# Patient Record
Sex: FEMALE | Race: WHITE
[De-identification: names, ages, dates, MRNs, and addresses within clinical notes are randomized per-mention and may not be internally consistent; named-entity substitution may affect disease eponyms.]

---

## 2017-09-03 NOTE — EDM.PDOC
ED HPI GENERAL MEDICAL PROBLEM





- General


Chief Complaint: Chest Pain


Stated Complaint: SOB


Time Seen by Provider: 09/03/17 14:00


Source of Information: Reports: Patient


History Limitations: Reports: No Limitations





- History of Present Illness


INITIAL COMMENTS - FREE TEXT/NARRATIVE: 





Brissa comes in approximately 10 minutes after experiencing an episode of 

dizziness, rapid heart beat and weakness, associated with some retrosternal 

chest pains. Sxs lasted for less than a minute, but she is visibly shaken. 

There was no headache, change in vision, SOB, nausea, or sweats. Of interest is 

a PMH of "racing heart" studied at the [facility] in 2011, no confirmation of 

tachyarrthymia, and managed with Cartia XT successfully over the past 4 years. 

She has been med compliant. 


  ** Chest


Pain Score (Numeric/FACES): 0





- Related Data


 Allergies











Allergy/AdvReac Type Severity Reaction Status Date / Time


 


Antihistamines - Alkylamine Allergy  Cannot Verified 09/03/17 14:05





   Remember  


 


iodine Allergy  Cannot Verified 09/03/17 14:05





   Remember  


 


Penicillins Allergy  Cannot Verified 09/03/17 14:05





   Remember  


 


povidone-iodine Allergy  Cannot Verified 09/03/17 14:05





[From Betadine]   Remember  


 


soap [From Betadine] Allergy  Cannot Verified 09/03/17 14:05





   Remember  


 


Sulfa (Sulfonamide Allergy  Cannot Verified 09/03/17 14:05





Antibiotics)   Remember  











Home Meds: 


 Home Meds





Aspirin [Adult Low Dose Aspirin EC] 81 mg PO DAILY 06/03/14 [History]


Diltiazem [Cardizem CD] 180 mg PO DAILY 06/03/14 [History]


Hydrochlorothiazide/Lisinopril [Lisinopril/HCTZ 20-12.5 MG] 1 tab PO DAILY 06/03 /14 [History]


atorvaSTATin Calcium [Atorvastatin Calcium] 10 mg PO DAILY 06/03/14 [History]











Past Medical History


Cardiovascular History: Reports: Other (See Below) (cardiac dysrhythmia)





Social & Family History





- Tobacco Use


Second Hand Smoke Exposure: Yes





- Alcohol Use


Days Per Week of Alcohol Use: 0





- Recreational Drug Use


Recreational Drug Use: No





ED ROS GENERAL





- Review of Systems


Review Of Systems: ROS reveals no pertinent complaints other than HPI.





ED EXAM, GENERAL





- Physical Exam


Exam: See Below


Exam Limited By: No Limitations


General Appearance: Alert, WD/WN, Anxious


Throat/Mouth: Normal Inspection, Normal Oropharynx, No Airway Compromise


Head: Normocephalic


Neck: Normal Inspection, Supple, Non-Tender


Respiratory/Chest: No Respiratory Distress, Lungs Clear, Normal Breath Sounds, 

Chest Non-Tender


Cardiovascular: Normal Peripheral Pulses, Regular Rate, Rhythm, No Edema, No 

Gallop, No Murmur


GI/Abdominal: Normal Bowel Sounds, Soft, Non-Tender, No Organomegaly, No 

Distention, No Mass


Back Exam: Normal Inspection


Extremities: Normal Inspection


Neurological: Alert, Oriented, CN II-XII Intact, Normal Cognition, No Motor/

Sensory Deficits


Psychiatric: Normal Affect, Anxious


Skin Exam: Warm, Dry


Lymphatic: No Adenopathy





Course





- Vital Signs


Text/Narrative:: 





Brissa remained stable at the [facility] ED. No meds were administered. She remained 

in a NSR.


Last Recorded V/S: 


 Last Vital Signs











Temp  36.6 C   09/03/17 14:10


 


Pulse  94   09/03/17 14:10


 


Resp  21 H  09/03/17 14:10


 


BP  136/57 L  09/03/17 14:10


 


Pulse Ox  95   09/03/17 14:10














- Orders/Labs/Meds


Orders: 


 Active Orders 24 hr











 Category Date Time Status


 


 EKG Documentation Completion [RC] ASDIRECTED Care  09/03/17 14:06 Active


 


 Sodium Chloride 0.9% [Saline Flush] Med  09/03/17 14:06 Active





 10 ml FLUSH ASDIRECTED PRN   


 


 Saline Lock Insert [OM.PC] Routine Oth  09/03/17 14:06 Ordered


 


 EKG 12 Lead [EK] Routine Ther  09/03/17 14:05 Ordered








 Medication Orders





Sodium Chloride (Saline Flush)  10 ml FLUSH ASDIRECTED PRN


   PRN Reason: Keep Vein Open


   Last Admin: 09/03/17 14:12  Dose: 10 ml








Labs: 


 Laboratory Tests











  09/03/17 09/03/17 09/03/17 Range/Units





  14:15 14:15 14:15 


 


WBC  8.1    (4.5-12.0)  X10-3/uL


 


RBC  4.70    (3.23-5.20)  x10(6)uL


 


Hgb  14.1    (11.5-15.5)  g/dL


 


Hct  41.8    (30.0-51.3)  %


 


MCV  88.9    (80-96)  fL


 


MCH  30.1    (27.7-33.6)  pg


 


MCHC  33.8    (32.2-35.4)  g/dL


 


RDW  12.8    (11.5-15.5)  %


 


Plt Count  251    (125-369)  X10(3)uL


 


MPV  8.4    (7.4-10.4)  fL


 


Neut % (Auto)  64.7    (46-82)  %


 


Lymph % (Auto)  25.6    (13-37)  %


 


Mono % (Auto)  7.0    (4-12)  %


 


Eos % (Auto)  2    (1.0-5.0)  %


 


Baso % (Auto)  1    (0-2)  %


 


Neut # (Auto)  5.2    (1.6-8.3)  #


 


Lymph # (Auto)  2.1    (0.6-5.0)  #


 


Mono # (Auto)  0.6    (0.0-1.3)  #


 


Eos # (Auto)  0.2    (0.0-0.8)  #


 


Baso # (Auto)  0.0    (0.0-0.2)  #


 


Sodium   137   (135-145)  mmol/L


 


Potassium   3.9   (3.5-5.3)  mmol/L


 


Chloride   105   (100-110)  mmol/L


 


Carbon Dioxide   23   (23-29)  mmol/L


 


BUN   21   (8-23)  mg/dL


 


Creatinine   1.2   (0.6-1.3)  mg/dL


 


Est Cr Clr Drug Dosing   41.50   mL/min


 


Estimated GFR (MDRD)   45 L   (>60)  


 


BUN/Creatinine Ratio   17.5   (9-20)  


 


Glucose   152 H   ()  mg/dL


 


Calcium   9.4   (8.6-10.2)  mg/dL


 


Total Bilirubin   0.7   (0.1-1.3)  mg/dL


 


AST   22   (5-27)  IU/L


 


ALT   14   (14-26)  IU/L


 


Alkaline Phosphatase   92   ()  IU/L


 


Troponin I    < 0.01 L  (0.02-0.06)  NG/ML


 


Total Protein   7.4   (6.0-8.0)  g/dL


 


Albumin   4.4   (3.2-4.6)  g/dL


 


Globulin   3.0   g/dL


 


Albumin/Globulin Ratio   1.5   


 


TSH, Ultra Sensitive     (0.4-5.5)  nlU/mL














  09/03/17 Range/Units





  14:15 


 


WBC   (4.5-12.0)  X10-3/uL


 


RBC   (3.23-5.20)  x10(6)uL


 


Hgb   (11.5-15.5)  g/dL


 


Hct   (30.0-51.3)  %


 


MCV   (80-96)  fL


 


MCH   (27.7-33.6)  pg


 


MCHC   (32.2-35.4)  g/dL


 


RDW   (11.5-15.5)  %


 


Plt Count   (125-369)  X10(3)uL


 


MPV   (7.4-10.4)  fL


 


Neut % (Auto)   (46-82)  %


 


Lymph % (Auto)   (13-37)  %


 


Mono % (Auto)   (4-12)  %


 


Eos % (Auto)   (1.0-5.0)  %


 


Baso % (Auto)   (0-2)  %


 


Neut # (Auto)   (1.6-8.3)  #


 


Lymph # (Auto)   (0.6-5.0)  #


 


Mono # (Auto)   (0.0-1.3)  #


 


Eos # (Auto)   (0.0-0.8)  #


 


Baso # (Auto)   (0.0-0.2)  #


 


Sodium   (135-145)  mmol/L


 


Potassium   (3.5-5.3)  mmol/L


 


Chloride   (100-110)  mmol/L


 


Carbon Dioxide   (23-29)  mmol/L


 


BUN   (8-23)  mg/dL


 


Creatinine   (0.6-1.3)  mg/dL


 


Est Cr Clr Drug Dosing   mL/min


 


Estimated GFR (MDRD)   (>60)  


 


BUN/Creatinine Ratio   (9-20)  


 


Glucose   ()  mg/dL


 


Calcium   (8.6-10.2)  mg/dL


 


Total Bilirubin   (0.1-1.3)  mg/dL


 


AST   (5-27)  IU/L


 


ALT   (14-26)  IU/L


 


Alkaline Phosphatase   ()  IU/L


 


Troponin I   (0.02-0.06)  NG/ML


 


Total Protein   (6.0-8.0)  g/dL


 


Albumin   (3.2-4.6)  g/dL


 


Globulin   g/dL


 


Albumin/Globulin Ratio   


 


TSH, Ultra Sensitive  1.67  (0.4-5.5)  nlU/mL











Meds: 


Medications











Generic Name Dose Route Start Last Admin





  Trade Name Lopez  PRN Reason Stop Dose Admin


 


Sodium Chloride  10 ml  09/03/17 14:06  09/03/17 14:12





  Saline Flush  FLUSH   10 ml





  ASDIRECTED PRN   Administration





  Keep Vein Open   














Discontinued Medications














Generic Name Dose Route Start Last Admin





  Trade Name Lopez  PRN Reason Stop Dose Admin


 


Aspirin  324 mg  09/03/17 14:06  09/03/17 14:00





  Aspirin  PO  09/03/17 14:07  324 mg





  ONETIME ONE   Administration














Departure





- Departure


Time of Disposition: 15:05


Disposition: Home, Self-Care 01


Condition: Good


Clinical Impression: 


 Atypical chest pain





Forms:  ED Department Discharge





- Problem List & Annotations


(1) Atypical chest pain


SNOMED Code(s): 017918887


   Code(s): R07.89 - OTHER CHEST PAIN   Status: Acute   Annotation/Comment:: 

Atypical chest pain, resolved. There were transient co-morbid sxs of dizziness 

that were momentary. She is asx. She will remain on the Cartia XT as directed, 

and follow up with PCP.    





- Problem List Review


Problem List Initiated/Reviewed/Updated: Yes





- My Orders


Last 24 Hours: 


My Active Orders





09/03/17 14:05


EKG 12 Lead [EK] Routine 





09/03/17 14:06


EKG Documentation Completion [RC] ASDIRECTED 


Sodium Chloride 0.9% [Saline Flush]   10 ml FLUSH ASDIRECTED PRN 


Saline Lock Insert [OM.PC] Routine 














- Assessment/Plan


Last 24 Hours: 


My Active Orders





09/03/17 14:05


EKG 12 Lead [EK] Routine 





09/03/17 14:06


EKG Documentation Completion [RC] ASDIRECTED 


Sodium Chloride 0.9% [Saline Flush]   10 ml FLUSH ASDIRECTED PRN 


Saline Lock Insert [OM.PC] Routine 











Plan: 





Follow up with PCP.

## 2019-04-30 NOTE — EDM.PDOC
ED HPI GENERAL MEDICAL PROBLEM





- General


Chief Complaint: Cardiovascular Problem


Stated Complaint: CHEST DISCOMFORT


Time Seen by Provider: 04/30/19 01:10


Source of Information: Reports: Patient, Family


History Limitations: Reports: No Limitations





- History of Present Illness


INITIAL COMMENTS - FREE TEXT/NARRATIVE: 





68 y.o.w.f with a h/o HTN, came to the ED with her  due to right 

shoulder pain which started a few min after she got dizzy and lightheaded while 

driving her car. Pt took 91 mg of ASA PTA. On arrival to the ED, he c/o right 

arm pain, going up her shoulder and neck. The pain is present  at rest, worse 

with movement of her shoulder. Pt denied trauma. No F/C, No N/V no sob. /

63 RR 18 Pulse ox 96% on RA Pulse 71 Temp 36.6


Onset Date: 04/29/19


Onset Time: 17:00


Duration: Hour(s):, Intermittent


Location: Reports: Upper Extremity, Right


Quality: Reports: Ache, Burning, Dull


Severity: Moderate


Improves with: Reports: Rest


Worsens with: Reports: Movement (of right shoulder)


Context: Reports: Other (pain came on at rest.)


Associated Symptoms: Reports: Diaphoresis


Treatments PTA: Reports: Aspirin (81 mg)


  ** R shoulder & neck


Pain Score (Numeric/FACES): 7





- Related Data


 Allergies











Allergy/AdvReac Type Severity Reaction Status Date / Time


 


Antihistamines - Alkylamine Allergy  Cannot Verified 04/30/19 01:20





   Remember  


 


iodine Allergy  Cannot Verified 04/30/19 01:20





   Remember  


 


Penicillins Allergy  Cannot Verified 04/30/19 01:20





   Remember  


 


povidone-iodine Allergy  Cannot Verified 04/30/19 01:20





[From Betadine]   Remember  


 


soap [From Betadine] Allergy  Cannot Verified 04/30/19 01:20





   Remember  


 


Sulfa (Sulfonamide Allergy  Cannot Verified 04/30/19 01:20





Antibiotics)   Remember  











Home Meds: 


 Home Meds





Aspirin [Adult Low Dose Aspirin EC] 81 mg PO DAILY 06/03/14 [History]


Diltiazem [Cardizem CD] 180 mg PO DAILY 06/03/14 [History]


Hydrochlorothiazide/Lisinopril [Lisinopril/HCTZ 20-12.5 MG] 1 tab PO DAILY 06/03 /14 [History]


atorvaSTATin Calcium [Atorvastatin Calcium] 10 mg PO BEDTIME 06/03/14 [History]











Past Medical History


Cardiovascular History: Reports: Other (See Below) (cardiac dysrhythmia)


OB/GYN History: Reports: Pregnancy


Musculoskeletal History: Reports: Back Pain, Chronic





- Past Surgical History


GI Surgical History: Reports: Appendectomy, Cholecystectomy, Colonoscopy





Social & Family History





- Caffeine Use


Caffeine Use: Reports: Coffee





ED ROS GENERAL





- Review of Systems


Review Of Systems: See Below


Constitutional: Reports: No Symptoms


HEENT: Reports: No Symptoms


Respiratory: Reports: No Symptoms


Cardiovascular: Reports: Chest Pain, Lightheadedness


Endocrine: Reports: No Symptoms


GI/Abdominal: Reports: No Symptoms


: Reports: No Symptoms


Musculoskeletal: Reports: No Symptoms


Skin: Reports: No Symptoms


Neurological: Reports: No Symptoms


Psychiatric: Reports: No Symptoms


Hematologic/Lymphatic: Reports: No Symptoms


Immunologic: Reports: No Symptoms





ED EXAM, GENERAL





- Physical Exam


Exam: See Below


Exam Limited By: No Limitations


General Appearance: Alert, WD/WN, Moderate Distress


Eye Exam: Bilateral Eye: Normal Inspection


Ears: Normal External Exam


Ear Exam: Bilateral Ear: Auricle Normal


Nose: Normal Inspection


Throat/Mouth: Normal Inspection


Head: Atraumatic, Normocephalic


Neck: Normal Inspection, Supple, Non-Tender


Respiratory/Chest: No Respiratory Distress, Lungs Clear, Normal Breath Sounds, 

Chest Non-Tender


Cardiovascular: Normal Peripheral Pulses, Regular Rate, Rhythm, No Edema


Peripheral Pulses: 1+: Brachial (R)


GI/Abdominal: Normal Bowel Sounds, Soft, Non-Tender, No Organomegaly


 (Female) Exam: Deferred


Rectal (Female) Exam: Deferred


Back Exam: Normal Inspection, Full Range of Motion


Extremities: Normal Inspection, Normal Range of Motion, Non-Tender


Neurological: Alert, Oriented, CN II-XII Intact, Normal Cognition, Normal Gait


Psychiatric: Normal Affect, Normal Mood


Skin Exam: Warm, Dry, Intact, Normal Color, No Rash


Lymphatic: No Adenopathy





EKG INTERPRETATION


EKG Date: 04/30/19


Time: 01:15


Rhythm: NSR


Rate (Beats/Min): 68


Axis: Normal


P-Wave: Present


QRS: Normal


ST-T: Normal


QT: Normal


Comparison: NA - No Prior EKG





Course





- Vital Signs


Text/Narrative:: 








68 y.o.w.f with a h/o HTN, came to the ED with her  due to right 

shoulder pain which started a few min after she got dizzy and lightheaded while 

driving her car. Pt took 91 mg of ASA PTA. On arrival to the ED, he c/o right 

arm pain, going up her shoulder and neck. The pain is present  at rest, worse 

with movement of her shoulder. Pt denied trauma. No F/C, No N/V no sob. /

63 RR 18 Pulse ox 96% on RA Pulse 71 Temp 36.6


PE: Obese 68 y.o.w.f with right shoulder pan at rest


Imaging: CXR: NAD Angio CT: Unable to be performed due to allergica to iodine


Labs: D Dimer: 0.81 CBC neg except WBC 12.4 BMP Nl except BUN was 25 Cr 1.3 GFR 

41 Ca 10.4 Troponin was nl 0.017


Impression: Elevated D Dimer, unstable angina


Tx: ASA, Lovenox


3.14 am Consultation: Russell, Hospitalist, [facility]: Accepted the patient 

for transfer and further care.


Reexam: Her pain subsided after Tx


Plan: Transfer to [facility] for further care


Last Recorded V/S: 


 Last Vital Signs











Temp  36.4 C   04/30/19 01:10


 


Pulse  78   04/30/19 01:10


 


Resp  18   04/30/19 01:10


 


BP  132/63   04/30/19 01:10


 


Pulse Ox  96   04/30/19 01:10














- Orders/Labs/Meds


Orders: 


 Active Orders 24 hr











 Category Date Time Status


 


 Ang Chest [CT] Stat Exams  04/30/19 02:18 Ordered


 


 Chest 1V Frontal [CR] Stat Exams  04/30/19 01:39 Taken


 


 Peripheral IV Insertion Adult [OM.PC] Routine Oth  04/30/19 03:37 Ordered











Labs: 


 Laboratory Tests











  04/30/19 04/30/19 04/30/19 Range/Units





  01:15 01:15 01:15 


 


WBC  12.1 H    (4.5-12.0)  X10-3/uL


 


RBC  5.03    (3.23-5.20)  x10(6)uL


 


Hgb  15.3    (11.5-15.5)  g/dL


 


Hct  45.6    (30.0-51.3)  %


 


MCV  90.6    (80-96)  fL


 


MCH  30.3    (27.7-33.6)  pg


 


MCHC  33.5    (32.2-35.4)  g/dL


 


RDW  12.8    (11.5-15.5)  %


 


Plt Count  229    (125-369)  X10(3)uL


 


MPV  9.5    (7.4-10.4)  fL


 


Neut % (Auto)  69.2    (46-82)  %


 


Lymph % (Auto)  20.4    (13-37)  %


 


Mono % (Auto)  6.5    (4-12)  %


 


Eos % (Auto)  2    (1.0-5.0)  %


 


Baso % (Auto)  2    (0-2)  %


 


Neut # (Auto)  8.4 H    (1.6-8.3)  #


 


Lymph # (Auto)  2.5    (0.6-5.0)  #


 


Mono # (Auto)  0.8    (0.0-1.3)  #


 


Eos # (Auto)  0.2    (0.0-0.8)  #


 


Baso # (Auto)  0.2    (0.0-0.2)  #


 


PT   9.6   (8.7-11.1)  


 


INR   0.99   (0.89-1.13)  


 


D-Dimer, Quantitative   0.81 H   (0.0-0.59)  mg/LFEU


 


Sodium    140  (135-145)  mmol/L


 


Potassium    3.7  (3.5-5.3)  mmol/L


 


Chloride    103  (100-110)  mmol/L


 


Carbon Dioxide    29  (21-32)  mmol/L


 


BUN    25 H  (7-18)  mg/dL


 


Creatinine    1.3 H  (0.55-1.02)  mg/dL


 


Est Cr Clr Drug Dosing    37.27  mL/min


 


Estimated GFR (MDRD)    41 L  (>60)  


 


BUN/Creatinine Ratio    19.2  (9-20)  


 


Glucose    122 H  ()  mg/dL


 


Calcium    10.4 H  (8.6-10.2)  mg/dL


 


Magnesium     (1.8-2.5)  mg/dL


 


Troponin I     (<0.017-0.056)  ng/mL














  04/30/19 04/30/19 Range/Units





  01:15 01:15 


 


WBC    (4.5-12.0)  X10-3/uL


 


RBC    (3.23-5.20)  x10(6)uL


 


Hgb    (11.5-15.5)  g/dL


 


Hct    (30.0-51.3)  %


 


MCV    (80-96)  fL


 


MCH    (27.7-33.6)  pg


 


MCHC    (32.2-35.4)  g/dL


 


RDW    (11.5-15.5)  %


 


Plt Count    (125-369)  X10(3)uL


 


MPV    (7.4-10.4)  fL


 


Neut % (Auto)    (46-82)  %


 


Lymph % (Auto)    (13-37)  %


 


Mono % (Auto)    (4-12)  %


 


Eos % (Auto)    (1.0-5.0)  %


 


Baso % (Auto)    (0-2)  %


 


Neut # (Auto)    (1.6-8.3)  #


 


Lymph # (Auto)    (0.6-5.0)  #


 


Mono # (Auto)    (0.0-1.3)  #


 


Eos # (Auto)    (0.0-0.8)  #


 


Baso # (Auto)    (0.0-0.2)  #


 


PT    (8.7-11.1)  


 


INR    (0.89-1.13)  


 


D-Dimer, Quantitative    (0.0-0.59)  mg/LFEU


 


Sodium    (135-145)  mmol/L


 


Potassium    (3.5-5.3)  mmol/L


 


Chloride    (100-110)  mmol/L


 


Carbon Dioxide    (21-32)  mmol/L


 


BUN    (7-18)  mg/dL


 


Creatinine    (0.55-1.02)  mg/dL


 


Est Cr Clr Drug Dosing    mL/min


 


Estimated GFR (MDRD)    (>60)  


 


BUN/Creatinine Ratio    (9-20)  


 


Glucose    ()  mg/dL


 


Calcium    (8.6-10.2)  mg/dL


 


Magnesium   2.0  (1.8-2.5)  mg/dL


 


Troponin I  < 0.017 L   (<0.017-0.056)  ng/mL











Meds: 


Medications














Discontinued Medications














Generic Name Dose Route Start Last Admin





  Trade Name Yaoq  PRN Reason Stop Dose Admin


 


Aspirin  243 mg  04/30/19 01:38  04/30/19 01:43





  Aspirin  PO  04/30/19 01:39  243 mg





  ONETIME ONE   Administration





     





     





     





     


 


Aspirin  162 mg  04/30/19 01:40  04/30/19 01:44





  Aspirin  PO  04/30/19 01:41  Not Given





  ONETIME STA   





     





     





     





     


 


Enoxaparin Sodium  100 mg  04/30/19 03:25  04/30/19 03:37





  Lovenox  SUBCUT  04/30/19 03:26  100 mg





  ONETIME STA   Administration





     





     





     





     


 


Sodium Chloride  500 mls @ 999 mls/hr  04/30/19 01:39  04/30/19 03:37





  Normal Saline  IV  04/30/19 02:09  Not Given





  .BOLUS ONE   





     





     





     





     


 


Iopamidol  75 ml  04/30/19 02:27  





  Isovue-370 (76%)  IV  04/30/19 02:28  





  ASDIRECTED ONE   





     





     





     





     


 


Sodium Chloride  10 ml  04/30/19 03:37  04/30/19 02:25





  Saline Flush  FLUSH   10 ml





  ASDIRECTED PRN   Administration





  Keep Vein Open   





     





     





     














Departure





- Departure


Time of Disposition: 03:49


Disposition: DC/Tfer to Acute Hospital [facility]


Reason for Transfer *Q: Other (No VQ scan availability)


Condition: Fair


Clinical Impression: 


 Unstable angina, D-dimer, elevated





Instructions:  Enoxaparin injection


Referrals: 


Swapnil Caldera MD [Primary Care Provider] - 


Forms:  ED Department Discharge





- My Orders


Last 24 Hours: 


My Active Orders





04/30/19 01:39


Chest 1V Frontal [CR] Stat 





04/30/19 02:18


Ang Chest [CT] Stat 





04/30/19 03:37


Peripheral IV Insertion Adult [OM.PC] Routine 














- Assessment/Plan


Last 24 Hours: 


My Active Orders





04/30/19 01:39


Chest 1V Frontal [CR] Stat 





04/30/19 02:18


Ang Chest [CT] Stat 





04/30/19 03:37


Peripheral IV Insertion Adult [OM.PC] Routine

## 2019-04-30 NOTE — CR
INDICATION:  Chest pain. 



CHEST ONE VIEW:  AP portable upright view of the chest was obtained 04/30/19 
and compared with 02/19/11. 



The heart did not appear enlarged. 



The aorta is tortuous to a moderate degree. 



Overlying EKG leads are noted. 



A definite active infiltrate or effusion was not identified. 



Evidence of exogenous obesity is noted. 



IMPRESSION:  No acute process. 
MTDD

## 2020-11-18 ENCOUNTER — HOSPITAL ENCOUNTER (EMERGENCY)
Dept: HOSPITAL 7 - FB.ED | Age: 70
Discharge: HOME | End: 2020-11-18
Payer: MEDICARE

## 2020-11-18 VITALS — DIASTOLIC BLOOD PRESSURE: 72 MMHG | SYSTOLIC BLOOD PRESSURE: 136 MMHG | HEART RATE: 77 BPM

## 2020-11-18 DIAGNOSIS — Z79.899: ICD-10-CM

## 2020-11-18 DIAGNOSIS — Z91.048: ICD-10-CM

## 2020-11-18 DIAGNOSIS — K59.00: ICD-10-CM

## 2020-11-18 DIAGNOSIS — R00.2: Primary | ICD-10-CM

## 2020-11-18 DIAGNOSIS — Z88.0: ICD-10-CM

## 2020-11-18 DIAGNOSIS — Z79.82: ICD-10-CM

## 2020-11-18 DIAGNOSIS — Z88.8: ICD-10-CM

## 2020-11-18 DIAGNOSIS — Z88.2: ICD-10-CM

## 2020-11-18 DIAGNOSIS — E66.9: ICD-10-CM

## 2020-11-18 PROCEDURE — 85025 COMPLETE CBC W/AUTO DIFF WBC: CPT

## 2020-11-18 PROCEDURE — 71045 X-RAY EXAM CHEST 1 VIEW: CPT

## 2020-11-18 PROCEDURE — 96376 TX/PRO/DX INJ SAME DRUG ADON: CPT

## 2020-11-18 PROCEDURE — 99285 EMERGENCY DEPT VISIT HI MDM: CPT

## 2020-11-18 PROCEDURE — 84484 ASSAY OF TROPONIN QUANT: CPT

## 2020-11-18 PROCEDURE — 93005 ELECTROCARDIOGRAM TRACING: CPT

## 2020-11-18 PROCEDURE — 36415 COLL VENOUS BLD VENIPUNCTURE: CPT

## 2020-11-18 PROCEDURE — 81001 URINALYSIS AUTO W/SCOPE: CPT

## 2020-11-18 PROCEDURE — 96374 THER/PROPH/DIAG INJ IV PUSH: CPT

## 2020-11-18 PROCEDURE — 80053 COMPREHEN METABOLIC PANEL: CPT

## 2020-11-18 PROCEDURE — 83735 ASSAY OF MAGNESIUM: CPT

## 2020-11-18 NOTE — EDM.PDOC
ED HPI GENERAL MEDICAL PROBLEM





- General


Chief Complaint: General


Stated Complaint: WEAKNESS,DIZZINESS


Time Seen by Provider: 20 15:40


Source of Information: Reports: Patient


History Limitations: Reports: No Limitations





- History of Present Illness


INITIAL COMMENTS - FREE TEXT/NARRATIVE: 





patient presented to the ED from the clinic because of palpitations, weakness, 

and dizziness. When she was triaged in the clinic she was tachycardic at 189 

although she denies any chest pain, nausea, vomiting, or dyspnea. She also c/o 

being constipated for 1 week now.


  ** low abdomen


Pain Score (Numeric/FACES): 3





- Related Data


                                    Allergies











Allergy/AdvReac Type Severity Reaction Status Date / Time


 


Antihistamines - Alkylamine Allergy  Cannot Verified 20 15:44





   Remember  


 


iodine Allergy  Cannot Verified 20 15:44





   Remember  


 


Penicillins Allergy  Cannot Verified 20 15:44





   Remember  


 


povidone-iodine Allergy  Cannot Verified 20 15:44





[From Betadine]   Remember  


 


soap [From Betadine] Allergy  Cannot Verified 20 15:44





   Remember  


 


Sulfa (Sulfonamide Allergy  Cannot Verified 20 15:44





Antibiotics)   Remember  











Home Meds: 


                                    Home Meds





Aspirin [Adult Low Dose Aspirin EC] 81 mg PO DAILY 14 [History]


Diltiazem [Cardizem CD] 180 mg PO DAILY 14 [History]


Hydrochlorothiazide/Lisinopril [Lisinopril/HCTZ 20-12.5 MG] 1 tab PO DAILY 

14 [History]


atorvaSTATin Calcium [Atorvastatin Calcium] 10 mg PO BEDTIME 14 [History]











Past Medical History


Cardiovascular History: Reports: Other (See Below) (cardiac dysrhythmia)


Gastrointestinal History: Reports: GERD


OB/GYN History: Reports: Pregnancy


Other OB/GYN History: 


Musculoskeletal History: Reports: Back Pain, Chronic


Endocrine/Metabolic History: Reports: Obesity/BMI 30+


Oncologic (Cancer) History: Reports: Malignant Melanoma


Dermatologic History: Reports: Melanoma





- Infectious Disease History


Infectious Disease History: Reports: Chicken Pox, Influenza, Measles, Mumps





- Past Surgical History


GI Surgical History: Reports: Appendectomy, Cholecystectomy, Colonoscopy





Social & Family History





- Family History


Family Medical History: No Pertinent Family History





- Caffeine Use


Caffeine Use: Reports: Coffee





ED ROS GENERAL





- Review of Systems


Review Of Systems: See Below


Constitutional: Reports: No Symptoms


HEENT: Reports: No Symptoms


Respiratory: Reports: No Symptoms


Cardiovascular: Reports: Lightheadedness, Palpitations


Endocrine: Reports: No Symptoms


GI/Abdominal: Reports: Constipation


: Reports: No Symptoms


Musculoskeletal: Reports: No Symptoms


Skin: Reports: No Symptoms


Neurological: Reports: Dizziness





ED EXAM, GENERAL





- Physical Exam


Exam: See Below


Exam Limited By: No Limitations


General Appearance: Alert, No Apparent Distress


Ears: Normal External Exam, Normal Canal, Hearing Grossly Normal


Nose: Normal Inspection, Normal Mucosa


Throat/Mouth: Normal Inspection, Normal Lips, Normal Teeth


Head: Atraumatic, Normocephalic


Neck: Normal Inspection, Supple, Non-Tender, Full Range of Motion


Respiratory/Chest: No Respiratory Distress, Lungs Clear, Normal Breath Sounds


Cardiovascular: Normal Peripheral Pulses, No Edema, No Gallop, Tachycardia


GI/Abdominal: Normal Bowel Sounds, Soft, Non-Tender, No Organomegaly


Back Exam: Normal Inspection, Full Range of Motion


Extremities: Normal Inspection, Normal Range of Motion, Non-Tender


Neurological: Alert, Oriented, CN II-XII Intact, Normal Cognition, Normal Gait





Course





- Vital Signs


Text/Narrative:: 





Labs/EKG result was discussed with patient


EKG-Sinus Tachycardia


Cardizem  a total of 25 mg IV was given and her HR was down to 70's upon 

discharge and her symptoms resolved.


Last Recorded V/S: 


                                Last Vital Signs











Temp  36.6 C   20 18:40


 


Pulse  77   20 18:40


 


Resp  18   20 18:40


 


BP  136/72   20 18:40


 


Pulse Ox  98   20 18:40














- Orders/Labs/Meds


Orders: 


                               Active Orders 24 hr











 Category Date Time Status


 


 Saline Lock Insert [OM.PC] Routine Oth  20 15:48 Ordered


 


 EKG 12 Lead [EK] Routine Ther  20 15:48 Ordered











Labs: 


                                Laboratory Tests











  20 Range/Units





  16:15 16:35 16:35 


 


WBC   10.5 H   (3.0-10.3)  x10-3/uL


 


RBC   4.80   (3.60-5.20)  x10(6)uL


 


Hgb   14.7   (11.4-15.5)  g/dL


 


Hct   43.1   (34.2-48.2)  %


 


MCV   89.9   (76.7-100.5)  fL


 


MCH   30.6   (23.9-33.9)  pg


 


MCHC   34.1   (31.9-34.8)  g/dL


 


RDW   13.3   (12.3-16.5)  %


 


Plt Count   293   (151-488)  x10(3)uL


 


MPV   10.0   (7.1-12.4)  fL


 


Neut % (Auto)   78.0 H   (30.8-76.2)  %


 


Lymph % (Auto)   12.3 L   (18.4-52.1)  %


 


Mono % (Auto)   8.5   (4.4-15.7)  %


 


Eos % (Auto)   0.5 L   (0.6-8.1)  %


 


Baso % (Auto)   0.7   (0.2-1.5)  %


 


Neut # (Auto)   8.2 H   (1.5-6.3)  x10-3/uL


 


Lymph # (Auto)   1.3   (1.0-4.4)  x10-3/uL


 


Mono # (Auto)   0.9   (0.3-1.0)  x10-3/uL


 


Eos # (Auto)   0.1   (0.0-0.8)  x10-3/uL


 


Baso # (Auto)   0.1   (0.0-0.1)  x10-3/uL


 


Sodium    141  (135-145)  mmol/L


 


Potassium    3.4 L  (3.5-5.3)  mmol/L


 


Chloride    102  (100-110)  mmol/L


 


Carbon Dioxide    28  (21-32)  mmol/L


 


BUN    16  (7-18)  mg/dL


 


Creatinine    1.3 H  (0.55-1.02)  mg/dL


 


Est Cr Clr Drug Dosing    34.77  mL/min


 


Estimated GFR (MDRD)    40 L  (>60)  


 


BUN/Creatinine Ratio    12.3  (9-20)  


 


Glucose    98  ()  mg/dL


 


Calcium    9.9  (8.6-10.2)  mg/dL


 


Magnesium    2.0  (1.8-2.5)  mg/dL


 


Total Bilirubin    0.6  (0.1-1.3)  mg/dL


 


AST    18  (5-25)  IU/L


 


ALT    24  (12-36)  U/L


 


Alkaline Phosphatase    141 H  ()  IU/L


 


Troponin I     (4.0-60.3)  pg/mL


 


Total Protein    7.8  (6.0-8.0)  g/dL


 


Albumin    3.6  (3.2-4.6)  g/dL


 


Globulin    4.2  g/dL


 


Albumin/Globulin Ratio    0.9  


 


Urine Color  Yellow    (YELLOW)  


 


Urine Appearance  Clear    (CLEAR)  


 


Urine pH  6.5    (5.0-6.5)  


 


Ur Specific Gravity  1.005 L    (1.010-1.025)  


 


Urine Protein  Negative    (NEGATIVE)  mg/dL


 


Urine Glucose (UA)  Normal    (NORMAL)  mg/dL


 


Urine Ketones  Negative    (NEGATIVE)  mg/dL


 


Urine Occult Blood  Negative    (NEGATIVE)  


 


Urine Nitrite  Negative    (NEGATIVE)  


 


Urine Bilirubin  Negative    (NEGATIVE)  


 


Urine Urobilinogen  Normal    (NEGATIVE)  mg/dL


 


Ur Leukocyte Esterase  Negative    (NEGATIVE)  


 


Urine RBC  0-5    (0-5)  


 


Urine WBC  0-5    (0-5)  


 


Ur Squamous Epith Cells  Occasional    (NS,R,O)  


 


Urine Bacteria  Rare H    (NS)  














  20 Range/Units





  16:35 


 


WBC   (3.0-10.3)  x10-3/uL


 


RBC   (3.60-5.20)  x10(6)uL


 


Hgb   (11.4-15.5)  g/dL


 


Hct   (34.2-48.2)  %


 


MCV   (76.7-100.5)  fL


 


MCH   (23.9-33.9)  pg


 


MCHC   (31.9-34.8)  g/dL


 


RDW   (12.3-16.5)  %


 


Plt Count   (151-488)  x10(3)uL


 


MPV   (7.1-12.4)  fL


 


Neut % (Auto)   (30.8-76.2)  %


 


Lymph % (Auto)   (18.4-52.1)  %


 


Mono % (Auto)   (4.4-15.7)  %


 


Eos % (Auto)   (0.6-8.1)  %


 


Baso % (Auto)   (0.2-1.5)  %


 


Neut # (Auto)   (1.5-6.3)  x10-3/uL


 


Lymph # (Auto)   (1.0-4.4)  x10-3/uL


 


Mono # (Auto)   (0.3-1.0)  x10-3/uL


 


Eos # (Auto)   (0.0-0.8)  x10-3/uL


 


Baso # (Auto)   (0.0-0.1)  x10-3/uL


 


Sodium   (135-145)  mmol/L


 


Potassium   (3.5-5.3)  mmol/L


 


Chloride   (100-110)  mmol/L


 


Carbon Dioxide   (21-32)  mmol/L


 


BUN   (7-18)  mg/dL


 


Creatinine   (0.55-1.02)  mg/dL


 


Est Cr Clr Drug Dosing   mL/min


 


Estimated GFR (MDRD)   (>60)  


 


BUN/Creatinine Ratio   (9-20)  


 


Glucose   ()  mg/dL


 


Calcium   (8.6-10.2)  mg/dL


 


Magnesium   (1.8-2.5)  mg/dL


 


Total Bilirubin   (0.1-1.3)  mg/dL


 


AST   (5-25)  IU/L


 


ALT   (12-36)  U/L


 


Alkaline Phosphatase   ()  IU/L


 


Troponin I  8.5  (4.0-60.3)  pg/mL


 


Total Protein   (6.0-8.0)  g/dL


 


Albumin   (3.2-4.6)  g/dL


 


Globulin   g/dL


 


Albumin/Globulin Ratio   


 


Urine Color   (YELLOW)  


 


Urine Appearance   (CLEAR)  


 


Urine pH   (5.0-6.5)  


 


Ur Specific Gravity   (1.010-1.025)  


 


Urine Protein   (NEGATIVE)  mg/dL


 


Urine Glucose (UA)   (NORMAL)  mg/dL


 


Urine Ketones   (NEGATIVE)  mg/dL


 


Urine Occult Blood   (NEGATIVE)  


 


Urine Nitrite   (NEGATIVE)  


 


Urine Bilirubin   (NEGATIVE)  


 


Urine Urobilinogen   (NEGATIVE)  mg/dL


 


Ur Leukocyte Esterase   (NEGATIVE)  


 


Urine RBC   (0-5)  


 


Urine WBC   (0-5)  


 


Ur Squamous Epith Cells   (NS,R,O)  


 


Urine Bacteria   (NS)  











Meds: 


Medications














Discontinued Medications














Generic Name Dose Route Start Last Admin





  Trade Name Freq  PRN Reason Stop Dose Admin


 


Diltiazem HCl  15 mg  20 15:55  20 16:43





  Diltiazem  IVPUSH  20 15:56  15 mg





  NOW STA   Administration


 


Diltiazem HCl  10 mg  20 16:59  20 17:45





  Diltiazem  IVPUSH  20 17:00  10 mg





  ONETIME ONE   Administration


 


Sodium Chloride  10 ml  20 15:48  20 16:20





  Saline Flush  FLUSH   10 ml





  ASDIRECTED PRN   Administration





  Keep Vein Open  














Departure





- Departure


Time of Disposition: 18:25


Disposition: Home, Self-Care 01


Condition: Good


Clinical Impression: 


 Palpitations, Constipation








- Discharge Information


Instructions:  Constipation, Adult, Easy-to-Read, Palpitations, Easy-to-Read


Referrals: 


Evan Schreiber MD [Primary Care Provider] - 


Forms:  ED Department Discharge


Additional Instructions: 


Please read discharge instructions on palpitations and constipation


Increase oral fluids


Take the prescription that Dr Schreiber prescribed you. Take it wit 2 scoops of 

miralax dissolved in 2 glasses of water. Take it daiily until you have a normal 

bowel movement





Sepsis Event Note (ED)





- Evaluation


Sepsis Screening Result: No Definite Risk





- My Orders


Last 24 Hours: 


My Active Orders





20 15:48


Saline Lock Insert [OM.PC] Routine 


EKG 12 Lead [EK] Routine 














- Assessment/Plan


Last 24 Hours: 


My Active Orders





20 15:48


Saline Lock Insert [OM.PC] Routine 


EKG 12 Lead [EK] Routine

## 2020-11-18 NOTE — CR
CHEST ONE VIEW



INDICATION:  Weakness, dyspnea.  



An AP upright portable view of the chest was obtained 11/18/2020 and compared 
with 04/30/20019 and 02/19/2011 again revealing evidence of exogenous obesity.  



The heart appeared slightly prominent but likely is at the upper limits of 
normal in size.  



Overlying EKG leads and snaps noted. 



The aorta is tortuous.  



A definite active infiltrate or effusion was not identified.  



No definite evidence of CHF is seen, although comparatively to the previous 
examination, there appears to be slightly increased prominence of upper lung 
field pulmonary vasculature raising question of pulmonary vascular congestion.  
This could be on the basis of CHF but should be correlated clinically as fluid 
overload or other abnormality could be present.  

MTDD

## 2021-02-03 ENCOUNTER — HOSPITAL ENCOUNTER (EMERGENCY)
Dept: HOSPITAL 7 - FB.ED | Age: 71
Discharge: HOME | End: 2021-02-03
Payer: MEDICARE

## 2021-02-03 VITALS — DIASTOLIC BLOOD PRESSURE: 65 MMHG | SYSTOLIC BLOOD PRESSURE: 124 MMHG | HEART RATE: 145 BPM

## 2021-02-03 DIAGNOSIS — E87.6: ICD-10-CM

## 2021-02-03 DIAGNOSIS — Z79.82: ICD-10-CM

## 2021-02-03 DIAGNOSIS — Z88.0: ICD-10-CM

## 2021-02-03 DIAGNOSIS — Z88.2: ICD-10-CM

## 2021-02-03 DIAGNOSIS — Z88.8: ICD-10-CM

## 2021-02-03 DIAGNOSIS — E66.9: ICD-10-CM

## 2021-02-03 DIAGNOSIS — Z79.899: ICD-10-CM

## 2021-02-03 DIAGNOSIS — I48.20: Primary | ICD-10-CM

## 2021-02-03 PROCEDURE — 96374 THER/PROPH/DIAG INJ IV PUSH: CPT

## 2021-02-03 PROCEDURE — 83880 ASSAY OF NATRIURETIC PEPTIDE: CPT

## 2021-02-03 PROCEDURE — 96376 TX/PRO/DX INJ SAME DRUG ADON: CPT

## 2021-02-03 PROCEDURE — 85027 COMPLETE CBC AUTOMATED: CPT

## 2021-02-03 PROCEDURE — 93005 ELECTROCARDIOGRAM TRACING: CPT

## 2021-02-03 PROCEDURE — 36415 COLL VENOUS BLD VENIPUNCTURE: CPT

## 2021-02-03 PROCEDURE — 99285 EMERGENCY DEPT VISIT HI MDM: CPT

## 2021-02-03 PROCEDURE — 84484 ASSAY OF TROPONIN QUANT: CPT

## 2021-02-03 PROCEDURE — 80053 COMPREHEN METABOLIC PANEL: CPT

## 2021-02-03 PROCEDURE — 99284 EMERGENCY DEPT VISIT MOD MDM: CPT

## 2021-02-03 NOTE — EDM.PDOC
ED HPI GENERAL MEDICAL PROBLEM





- General


Chief Complaint: Cardiovascular Problem


Stated Complaint: A-FIB


Time Seen by Provider: 21 21:05


Source of Information: Reports: Patient


History Limitations: Reports: No Limitations





- History of Present Illness


INITIAL COMMENTS - FREE TEXT/NARRATIVE: 





Patient presented to the ED because of palpitations since . She though it 

will go away but it didn't. There is no chest pain, nausea,vomiting, or dyspnea.





- Related Data


                                    Allergies











Allergy/AdvReac Type Severity Reaction Status Date / Time


 


Antihistamines - Alkylamine Allergy  Cannot Verified 21 21:29





   Remember  


 


iodine Allergy  Cannot Verified 21 21:29





   Remember  


 


Penicillins Allergy  Cannot Verified 21 21:29





   Remember  


 


povidone-iodine Allergy  Cannot Verified 21 21:29





[From Betadine]   Remember  


 


soap [From Betadine] Allergy  Cannot Verified 21 21:29





   Remember  


 


Sulfa (Sulfonamide Allergy  Cannot Verified 21 21:29





Antibiotics)   Remember  











Home Meds: 


                                    Home Meds





Aspirin [Adult Low Dose Aspirin EC] 81 mg PO DAILY 14 [History]


Diltiazem [Cardizem CD] 180 mg PO DAILY 14 [History]


Hydrochlorothiazide/Lisinopril [Lisinopril/HCTZ 20-12.5 MG] 1 tab PO DAILY 

14 [History]


atorvaSTATin Calcium [Atorvastatin Calcium] 10 mg PO BEDTIME 14 [History]











Past Medical History





- Past Health History


Medical/Surgical History: Denies Medical/Surgical History


Cardiovascular History: Reports: Afib


Other Cardiovascular History: Rapid irregular heart beat, hx of blood clot in 

both og her legs.


Gastrointestinal History: Reports: GERD


OB/GYN History: Reports: Pregnancy


Other OB/GYN History: 


Musculoskeletal History: Reports: Back Pain, Chronic


Endocrine/Metabolic History: Reports: Obesity/BMI 30+


Oncologic (Cancer) History: Reports: Malignant Melanoma


Dermatologic History: Reports: Melanoma





- Infectious Disease History


Infectious Disease History: Reports: Chicken Pox, Influenza, Measles, Mumps





- Past Surgical History


HEENT Surgical History: Reports: Adenoidectomy, Tonsillectomy


Cardiovascular Surgical History: Reports: None


GI Surgical History: Reports: Appendectomy, Cholecystectomy, Colonoscopy


Neurological Surgical History: Reports: Discectomy


Musculoskeletal Surgical History: Reports: None


Oncologic Surgical History: Reports: None





Social & Family History





- Family History


Family Medical History: No Pertinent Family History





- Tobacco Use


Tobacco Use Status *Q: Never Tobacco User


Second Hand Smoke Exposure: No





- Caffeine Use


Caffeine Use: Reports: Coffee





- Recreational Drug Use


Recreational Drug Use: No





ED ROS GENERAL





- Review of Systems


Review Of Systems: See Below


Constitutional: Reports: No Symptoms


HEENT: Reports: No Symptoms


Respiratory: Reports: No Symptoms


Cardiovascular: Reports: Palpitations


Endocrine: Reports: No Symptoms


GI/Abdominal: Reports: No Symptoms


: Reports: No Symptoms


Musculoskeletal: Reports: No Symptoms


Skin: Reports: No Symptoms





ED EXAM, GENERAL





- Physical Exam


Exam: See Below


Exam Limited By: No Limitations


General Appearance: Alert, No Apparent Distress


Eye Exam: Bilateral Eye: PERRL


Ears: Normal External Exam, Normal Canal


Nose: Normal Inspection, Normal Mucosa, No Blood


Throat/Mouth: Normal Inspection, Normal Lips, Normal Teeth


Head: Atraumatic, Normocephalic


Neck: Normal Inspection, Supple, Non-Tender, Full Range of Motion


Respiratory/Chest: No Respiratory Distress, Lungs Clear, Normal Breath Sounds


Cardiovascular: Normal Peripheral Pulses, Regular Rate, Rhythm, No Edema, No 

Gallop, No JVD, No Murmur, No Rub, Tachycardia


GI/Abdominal: Normal Bowel Sounds, Soft, Non-Tender, No Organomegaly


Back Exam: Normal Inspection, Full Range of Motion


Extremities: Normal Inspection, Normal Range of Motion, Non-Tender


Neurological: Alert, Oriented, CN II-XII Intact, Normal Cognition





Course





- Vital Signs


Text/Narrative:: 





Labs/EKG was discussed with the patient


Cardizem total of 60 mg IV and then she converted to a NSR


Last Recorded V/S: 


                                Last Vital Signs











Temp  36.4 C   21 21:00


 


Pulse  145 H  21 21:00


 


Resp  17   21 21:00


 


BP  124/65   21 21:00


 


Pulse Ox  99   21 21:00














- Orders/Labs/Meds


Orders: 


                               Active Orders 24 hr











 Category Date Time Status


 


 EKG Documentation Completion [RC] ASDIRECTED Care  21 21:21 Active


 


 Sodium Chloride 0.9% [Saline Flush] Med  21 21:29 Active





 10 ml FLUSH ASDIRECTED PRN   


 


 EKG 12 Lead [EK] Routine Ther  21 21:20 Ordered








                                Medication Orders





Sodium Chloride (Saline Flush)  10 ml FLUSH ASDIRECTED PRN


   PRN Reason: flush


   Last Admin: 21 21:41  Dose: 10 ml


   Documented by: ROD








Labs: 


                                Laboratory Tests











  21 Range/Units





  21:20 21:20 21:20 


 


WBC   7.6   (3.0-10.3)  x10-3/uL


 


RBC   4.64   (3.60-5.20)  x10(6)uL


 


Hgb   13.8   (11.4-15.5)  g/dL


 


Hct   42.6   (34.2-48.2)  %


 


MCV   92.0   (76.7-100.5)  fL


 


MCH   29.8   (23.9-33.9)  pg


 


MCHC   32.4   (31.9-34.8)  g/dL


 


RDW   14.4   (12.3-16.5)  %


 


Plt Count   240   (151-488)  x10(3)uL


 


Sodium  140    (135-145)  mmol/L


 


Potassium  3.4 L    (3.5-5.3)  mmol/L


 


Chloride  101    (100-110)  mmol/L


 


Carbon Dioxide  25    (21-32)  mmol/L


 


BUN  10    (7-18)  mg/dL


 


Creatinine  1.1 H    (0.55-1.02)  mg/dL


 


Est Cr Clr Drug Dosing  41.09    mL/min


 


Estimated GFR (MDRD)  49 L    (>60)  


 


BUN/Creatinine Ratio  9.1    (9-20)  


 


Glucose  105    ()  mg/dL


 


Calcium  9.9    (8.6-10.2)  mg/dL


 


Total Bilirubin  0.5    (0.1-1.3)  mg/dL


 


AST  16  D    (5-25)  IU/L


 


ALT  22    (12-36)  U/L


 


Alkaline Phosphatase  111    ()  IU/L


 


Troponin I    7.6  (4.0-60.3)  pg/mL


 


NT-Pro-B Natriuret Pep    290 H  (<=125)  pg/mL


 


Total Protein  7.1    (6.0-8.0)  g/dL


 


Albumin  4.1    (3.2-4.6)  g/dL


 


Globulin  3.0    g/dL


 


Albumin/Globulin Ratio  1.4    











Meds: 


Medications











Generic Name Dose Route Start Last Admin





  Trade Name Freq  PRN Reason Stop Dose Admin


 


Sodium Chloride  10 ml  21 21:29  21 21:41





  Saline Flush  FLUSH   10 ml





  ASDIRECTED PRN   Administration





  flush  














Discontinued Medications














Generic Name Dose Route Start Last Admin





  Trade Name Lopez  PRN Reason Stop Dose Admin


 


Digoxin  250 mcg  21 21:54 





  Lanoxin  IVPUSH  21 21:55 





  NOW STA  


 


Diltiazem HCl  15 mg  21 21:16  21 21:22





  Diltiazem  IVPUSH  21 21:17  15 mg





  ONETIME ONE   Administration


 


Diltiazem HCl  10 mg  21 21:33  21 21:38





  Diltiazem  IVPUSH  21 21:34  Not Given





  ONETIME ONE  


 


Diltiazem HCl  25 mg  21 21:37  21 21:38





  Diltiazem  IVPUSH  21 21:38  25 mg





  ONETIME ONE   Administration


 


Diltiazem HCl  10 mg  21 21:55  21 21:58





  Diltiazem  IVPUSH  21 21:56  10 mg





  ONETIME ONE   Administration


 


Sodium Chloride  500 mls @ 500 mls/hr  21 21:23  21 21:20





  Normal Saline  IV  21 22:22  500 mls/hr





  .BOLUS ONE   Administration














Departure





- Departure


Time of Disposition: 22:05


Disposition: Home, Self-Care 01


Condition: Good


Clinical Impression: 


 Chronic a-fib





Instructions:  Atrial Fibrillation, Easy-to-Read


Referrals: 


Evan Schreiber MD [Primary Care Provider] - 


Forms:  ED Department Discharge


Additional Instructions: 


Please read discharge instructions on chronic AFIB


Continue Diltiazem as prescribed


Follow up with your cardiologist in  1-2 weeks





Sepsis Event Note (ED)





- Evaluation


Sepsis Screening Result: No Definite Risk





- Focused Exam


Vital Signs: 


                                   Vital Signs











  Temp Pulse Resp BP Pulse Ox


 


 21 21:00  36.4 C  145 H  17  124/65  99














- My Orders


Last 24 Hours: 


My Active Orders





21 21:20


EKG 12 Lead [EK] Routine 





21 21:21


EKG Documentation Completion [RC] ASDIRECTED 





21 21:29


Sodium Chloride 0.9% [Saline Flush]   10 ml FLUSH ASDIRECTED PRN 














- Assessment/Plan


Last 24 Hours: 


My Active Orders





21 21:20


EKG 12 Lead [EK] Routine 





21 21:21


EKG Documentation Completion [RC] ASDIRECTED 





21 21:29


Sodium Chloride 0.9% [Saline Flush]   10 ml FLUSH ASDIRECTED PRN

## 2022-05-14 ENCOUNTER — HOSPITAL ENCOUNTER (EMERGENCY)
Dept: HOSPITAL 7 - FB.ED | Age: 72
Discharge: HOME | End: 2022-05-14
Payer: MEDICARE

## 2022-05-14 VITALS — HEART RATE: 69 BPM | SYSTOLIC BLOOD PRESSURE: 119 MMHG | DIASTOLIC BLOOD PRESSURE: 65 MMHG

## 2022-05-14 DIAGNOSIS — Z79.82: ICD-10-CM

## 2022-05-14 DIAGNOSIS — Z88.2: ICD-10-CM

## 2022-05-14 DIAGNOSIS — K21.9: ICD-10-CM

## 2022-05-14 DIAGNOSIS — Z88.0: ICD-10-CM

## 2022-05-14 DIAGNOSIS — E66.9: ICD-10-CM

## 2022-05-14 DIAGNOSIS — N81.6: Primary | ICD-10-CM

## 2022-05-14 DIAGNOSIS — Z91.048: ICD-10-CM

## 2022-05-14 DIAGNOSIS — I48.91: ICD-10-CM

## 2022-05-14 DIAGNOSIS — Z88.8: ICD-10-CM

## 2022-05-14 DIAGNOSIS — K59.02: ICD-10-CM

## 2022-05-14 DIAGNOSIS — Z90.49: ICD-10-CM

## 2022-05-14 DIAGNOSIS — Z91.041: ICD-10-CM

## 2022-05-14 DIAGNOSIS — Z79.01: ICD-10-CM

## 2022-05-14 DIAGNOSIS — Z79.899: ICD-10-CM

## 2022-05-14 DIAGNOSIS — D72.829: ICD-10-CM

## 2022-05-14 RX ADMIN — SODIUM PHOSPHATE ONE: 7; 19 ENEMA RECTAL at 05:30
